# Patient Record
Sex: MALE | Race: BLACK OR AFRICAN AMERICAN | NOT HISPANIC OR LATINO | URBAN - METROPOLITAN AREA
[De-identification: names, ages, dates, MRNs, and addresses within clinical notes are randomized per-mention and may not be internally consistent; named-entity substitution may affect disease eponyms.]

---

## 2018-03-21 ENCOUNTER — INITIAL CONSULT (OUTPATIENT)
Dept: OPHTHALMOLOGY | Facility: CLINIC | Age: 58
End: 2018-03-21
Payer: COMMERCIAL

## 2018-03-21 DIAGNOSIS — H11.001 PTERYGIUM OF RIGHT EYE: Primary | ICD-10-CM

## 2018-03-21 PROCEDURE — 99999 PR PBB SHADOW E&M-EST. PATIENT-LVL II: CPT | Mod: PBBFAC,,, | Performed by: OPHTHALMOLOGY

## 2018-03-21 PROCEDURE — 99499 UNLISTED E&M SERVICE: CPT | Mod: S$GLB,,, | Performed by: OPHTHALMOLOGY

## 2018-03-21 RX ORDER — METHOCARBAMOL 500 MG/1
500 TABLET, FILM COATED ORAL EVERY 8 HOURS
COMMUNITY

## 2018-03-21 NOTE — LETTER
[facility]  Ophthalmology  [address]  [place] [address]  Phone: [phone]  Fax: [phone]   March 21, 2018    Logan Chan MD  [address] [place] [address]    Patient: Vinicio Banerjee Sr.   MR Number: [id number]   YOB: 1960   Date of Visit: 3/21/2018       Dear Dr. Chan :    Thank you for referring Vinicio Banerjee SrShankar to me for evaluation. Here is my assessment and plan of care:      Pterygium of right eye    Recommend surgery for removal, but I no longer perform pterygium excision in my practice. Will refer this patient to other providers in the [place] area for surgical consultation.      If you have questions, please do not hesitate to call me. I look forward to following Vinicio Banerjee SrShankar along with you.    Sincerely,        Tamanna Brown MD       CC  No Recipients

## 2018-03-21 NOTE — PROGRESS NOTES
HPI     Eye Problem    Additional comments: Right eye.            Comments   56 y/o male presents for Pterygium Eval per Dr Chan.  Pt states growth has been there for some time OD but appears to be growing   with time.  Occasional Blur and Redness OD.  Has similar growth OS but not   yet on his pupil.    AT's prn OU        Last edited by Stacey Allison on 3/21/2018  1:22 PM. (History)            Assessment /Plan     For exam results, see Encounter Report.    Pterygium of right eye      The above diagnosis was explained to the patient and treatment initiated as prescribed.  All questions were answered and the patient is instructed to follow up if symptoms do not resolve or worsen.  Recommend surgery for removal.

## 2018-03-21 NOTE — LETTER
March 21, 2018      Logan Chan MD  [address]  [place] [address]           [facility] - Ophthalmology  [address]  [place] [address]  Phone: [phone]  Fax: [phone]          Patient: Vinicio Banerjee Sr.   MR Number: [id number]   YOB: 1960   Date of Visit: 3/21/2018       Dear Dr. Logan Chan:    Thank you for referring Vinicio Banerjee to me for evaluation. Attached you will find relevant portions of my assessment and plan of care.    If you have questions, please do not hesitate to call me. I look forward to following Vinicio Banerjee along with you.    Sincerely,    Tamanna Brown MD    Enclosure  CC:  No Recipients    If you would like to receive this communication electronically, please contact [email or web address] or [phone] to request more information on [employer] Link access.    For providers and/or their staff who would like to refer a patient to [facility], please contact us through our one-stop-shop provider referral line, [facility], at [phone].    If you feel you have received this communication in error or would no longer like to receive these types of communications, please e-mail [email or web address]